# Patient Record
Sex: FEMALE | URBAN - METROPOLITAN AREA
[De-identification: names, ages, dates, MRNs, and addresses within clinical notes are randomized per-mention and may not be internally consistent; named-entity substitution may affect disease eponyms.]

---

## 2021-10-07 ENCOUNTER — APPOINTMENT (RX ONLY)
Age: 76
Setting detail: DERMATOLOGY
End: 2021-10-07

## 2021-10-07 DIAGNOSIS — D69.2 OTHER NONTHROMBOCYTOPENIC PURPURA: ICD-10-CM

## 2021-10-07 DIAGNOSIS — L57.8 OTHER SKIN CHANGES DUE TO CHRONIC EXPOSURE TO NONIONIZING RADIATION: ICD-10-CM

## 2021-10-07 DIAGNOSIS — L82.1 OTHER SEBORRHEIC KERATOSIS: ICD-10-CM

## 2021-10-07 DIAGNOSIS — L57.0 ACTINIC KERATOSIS: ICD-10-CM

## 2021-10-07 DIAGNOSIS — L28.1 PRURIGO NODULARIS: ICD-10-CM

## 2021-10-07 DIAGNOSIS — D22 MELANOCYTIC NEVI: ICD-10-CM

## 2021-10-07 PROBLEM — D22.5 MELANOCYTIC NEVI OF TRUNK: Status: ACTIVE | Noted: 2021-10-07

## 2021-10-07 PROBLEM — D22.9 MELANOCYTIC NEVI, UNSPECIFIED: Status: ACTIVE | Noted: 2021-10-07

## 2021-10-07 PROBLEM — L30.9 DERMATITIS, UNSPECIFIED: Status: ACTIVE | Noted: 2021-10-07

## 2021-10-07 PROCEDURE — 17003 DESTRUCT PREMALG LES 2-14: CPT

## 2021-10-07 PROCEDURE — 99202 OFFICE O/P NEW SF 15 MIN: CPT | Mod: 25

## 2021-10-07 PROCEDURE — 11104 PUNCH BX SKIN SINGLE LESION: CPT

## 2021-10-07 PROCEDURE — ? BIOPSY BY PUNCH METHOD

## 2021-10-07 PROCEDURE — 17000 DESTRUCT PREMALG LESION: CPT | Mod: 59

## 2021-10-07 PROCEDURE — ? COUNSELING

## 2021-10-07 PROCEDURE — ? PATIENT SPECIFIC COUNSELING

## 2021-10-07 PROCEDURE — ? OBSERVATION

## 2021-10-07 PROCEDURE — ? LIQUID NITROGEN

## 2021-10-07 ASSESSMENT — LOCATION SIMPLE DESCRIPTION DERM
LOCATION SIMPLE: UPPER BACK
LOCATION SIMPLE: RIGHT UPPER ARM
LOCATION SIMPLE: LEFT UPPER BACK
LOCATION SIMPLE: LEFT LIP
LOCATION SIMPLE: NOSE

## 2021-10-07 ASSESSMENT — LOCATION DETAILED DESCRIPTION DERM
LOCATION DETAILED: NASAL DORSUM
LOCATION DETAILED: RIGHT LATERAL DISTAL UPPER ARM
LOCATION DETAILED: LEFT INFERIOR MEDIAL UPPER BACK
LOCATION DETAILED: INFERIOR THORACIC SPINE
LOCATION DETAILED: LEFT UPPER CUTANEOUS LIP

## 2021-10-07 ASSESSMENT — LOCATION ZONE DERM
LOCATION ZONE: TRUNK
LOCATION ZONE: LIP
LOCATION ZONE: ARM
LOCATION ZONE: NOSE

## 2021-10-07 NOTE — PROCEDURE: LIQUID NITROGEN
Duration Of Freeze Thaw-Cycle (Seconds): 12
Show Applicator Variable?: Yes
Consent: The patient's consent was obtained including but not limited to risks of crusting, scabbing, blistering, scarring, darker or lighter pigmentary change, recurrence, incomplete removal and infection.
Number Of Freeze-Thaw Cycles: 1 freeze-thaw cycle
Render Post-Care Instructions In Note?: no
Detail Level: Detailed
Post-Care Instructions: CRYOSURGERY (aka freezing) \\n\\nThe technique:\\nWe use liquid nitrogen (-320 degrees F) to treat many types of skin diseases.  It is a great tool to destroy precancerous cells in growths such as actinic keratoses and superficial benign growths such as seborrheic keratoses, skin tags and warts.  However, sometimes, growths recur after treatment. \\n\\nWhat to expect:\\n• Mild burning pain is expected during and following the procedure (up to one hour). \\n• Local swelling and redness is normal and lasts for a few days. \\n• After a hard freeze, a blister may form. \\n• Usually, a crust forms over the site and then falls off after a week or two. \\n• The treated skin may be red for weeks to months after the treatment. \\n• A risk of the procedure is a scar that leaves the skin whiter than the surrounding skin. \\n\\nAfter care:\\n• Wash the treated areas daily with soap and water. Pat dry, and cover with a thin coat of Vaseline or Aquaphor. A bandage is optional but usually not needed. \\n• It is important to protect the site from the sun during healing.  Either avoid sun exposure or cover the site. \\n• After one week, cover treated areas with sunscreen. \\n\\nIf the original lesion recurs, please return to me for further evaluation!

## 2021-10-07 NOTE — PROCEDURE: PATIENT SPECIFIC COUNSELING
** I've instructed the pt to use a heavy moisturizing cream like Aquaphor or CerVe cream every day. In adddition, apply amlactin lotion or CerVe SA cream or Gold Bond Rough and Bumpy every other day, alternating with her daily moisturizer.
Detail Level: Zone

## 2021-10-07 NOTE — PROCEDURE: BIOPSY BY PUNCH METHOD
Render Path Notes In Note?: No
Punch Size In Mm: 5
Validate Note Data (See Information Below): Yes
Wound Care: Aquaphor
Billing Type: Third-Party Bill
X Size Of Lesion In Cm (Optional): 0
Suture Removal: 10 days
Path Notes (To The Dermatopathologist): .
Anesthesia Type: 1% lidocaine with epinephrine and a 1:10 solution of 8.4% sodium bicarbonate
Hemostasis: None
Epidermal Sutures: 5-0 Surgipro
Anesthesia Volume In Cc: 4
Consent: Verbal consent was obtained and risks were reviewed including but not limited to scarring, infection, bleeding, incomplete removal, nerve damage and allergy to anesthesia.
Notification Instructions: Patient will be notified of biopsy results. However, patient instructed to call the office if not contacted within 2 weeks.
Information: Selecting Yes will display possible errors in your note based on the variables you have selected. This validation is only offered as a suggestion for you. PLEASE NOTE THAT THE VALIDATION TEXT WILL BE REMOVED WHEN YOU FINALIZE YOUR NOTE. IF YOU WANT TO FAX A PRELIMINARY NOTE YOU WILL NEED TO TOGGLE THIS TO 'NO' IF YOU DO NOT WANT IT IN YOUR FAXED NOTE.
Biopsy Type: H and E
Dressing: dry sterile dressing
Detail Level: Detailed
Post-Care Instructions: Today you had a skin biopsy done at [facility].\\n\\nLeave the bandage in place for 24 hours. After 24 hours, remove the bandage and clean with gentle soap and water. Please clean the site with soap and water at least daily. Apply Vaseline or Aquaphor ointment to the site at least once or twice daily.\\n\\nAfter the first 24 hours, you may leave the site uncovered or, if you choose, you may cover it with a new bandage. \\n\\nIf a scab develops over the biopsy site, please do not pick it off. \\n \\nPlease keep the area protected from theh sun with sun screen or protective clothing, such as a hat. \\n\\nPlease return to the office to have the sutures removed in 10 days.\\n\\nIf you develop increasing pain, redness, or drainage at the site, or any other concerns, please contact us.\\n\\nYou should receive your biopsy results generally within 7 to 14 days (either at a return visit or by phone). If you do not receive your results within 2 weeks, it is important that you contact us.

## 2021-10-07 NOTE — PROCEDURE: COUNSELING
Detail Level: Detailed
Patient Specific Counseling (Will Not Stick From Patient To Patient): ** The AK on her L upper lip is thin so I will not use Ln2 as it will dissolve on it's own
Detail Level: Simple
Detail Level: Generalized

## 2021-10-18 ENCOUNTER — APPOINTMENT (RX ONLY)
Age: 76
Setting detail: DERMATOLOGY
End: 2021-10-18

## 2021-10-18 DIAGNOSIS — L28.1 PRURIGO NODULARIS: ICD-10-CM

## 2021-10-18 DIAGNOSIS — Z48.00 ENCOUNTER FOR CHANGE OR REMOVAL OF NONSURGICAL WOUND DRESSING: ICD-10-CM

## 2021-10-18 PROCEDURE — ? SUTURE REMOVAL (NO GLOBAL PERIOD)

## 2021-10-18 PROCEDURE — 99211 OFF/OP EST MAY X REQ PHY/QHP: CPT

## 2021-10-18 PROCEDURE — ? PRESCRIPTION

## 2021-10-18 ASSESSMENT — LOCATION ZONE DERM: LOCATION ZONE: ARM

## 2021-10-18 ASSESSMENT — LOCATION DETAILED DESCRIPTION DERM: LOCATION DETAILED: RIGHT ANTERIOR DISTAL UPPER ARM

## 2021-10-18 ASSESSMENT — LOCATION SIMPLE DESCRIPTION DERM: LOCATION SIMPLE: RIGHT UPPER ARM

## 2021-10-19 RX ORDER — CLOBETASOL PROPIONATE 0.5 MG/G
CREAM TOPICAL BID
Qty: 15 | Refills: 3 | Status: ACTIVE

## 2022-02-15 ENCOUNTER — APPOINTMENT (RX ONLY)
Age: 77
Setting detail: DERMATOLOGY
End: 2022-02-15

## 2022-02-15 DIAGNOSIS — T50.905 ADVERSE EFFECT OF UNSPECIFIED DRUGS, MEDICAMENTS AND BIOLOGICAL SUBSTANCES: ICD-10-CM | Status: INADEQUATELY CONTROLLED

## 2022-02-15 PROBLEM — T50.905A ADVERSE EFFECT OF UNSPECIFIED DRUGS, MEDICAMENTS AND BIOLOGICAL SUBSTANCES, INITIAL ENCOUNTER: Status: ACTIVE | Noted: 2022-02-15

## 2022-02-15 PROCEDURE — ? TREATMENT REGIMEN

## 2022-02-15 PROCEDURE — ? COUNSELING

## 2022-02-15 PROCEDURE — ? PATIENT SPECIFIC COUNSELING

## 2022-02-15 PROCEDURE — 99213 OFFICE O/P EST LOW 20 MIN: CPT

## 2022-02-15 PROCEDURE — ? DIAGNOSIS COMMENT

## 2022-02-15 ASSESSMENT — LOCATION ZONE DERM: LOCATION ZONE: NECK

## 2022-02-15 ASSESSMENT — LOCATION DETAILED DESCRIPTION DERM: LOCATION DETAILED: RIGHT INFERIOR ANTERIOR NECK

## 2022-02-15 ASSESSMENT — LOCATION SIMPLE DESCRIPTION DERM: LOCATION SIMPLE: RIGHT ANTERIOR NECK

## 2022-02-15 NOTE — PROCEDURE: DIAGNOSIS COMMENT
Detail Level: Detailed
Comment: Samples of CerVe Itch relief provided.
Render Risk Assessment In Note?: yes

## 2022-02-15 NOTE — PROCEDURE: PATIENT SPECIFIC COUNSELING
Detail Level: Simple
Recommended alternating moisturizer menthol and pramoxine twice a day. \\n\\nShe will discuss alternatives to amlodipine with her PCP at her next visit.

## 2022-02-15 NOTE — HPI: RASH
What Type Of Note Output Would You Prefer (Optional)?: Bullet Format
How Severe Is Your Rash?: moderate
Is This A New Presentation, Or A Follow-Up?: Rash
Additional History: She scratches her neck. She moisturized with CeraVe without improvement. She occasionally has itching on her legs. \\n\\nShe started amlodipine at least 1 year ago. She switched to amlodipine June 2021 from metoprolol. She takes culturescene for gout.